# Patient Record
Sex: FEMALE | Race: WHITE | NOT HISPANIC OR LATINO | URBAN - METROPOLITAN AREA
[De-identification: names, ages, dates, MRNs, and addresses within clinical notes are randomized per-mention and may not be internally consistent; named-entity substitution may affect disease eponyms.]

---

## 2019-06-20 ENCOUNTER — OFFICE VISIT CONVERTED (OUTPATIENT)
Dept: FAMILY MEDICINE CLINIC | Facility: CLINIC | Age: 29
End: 2019-06-20
Attending: NURSE PRACTITIONER

## 2019-06-28 ENCOUNTER — HOSPITAL ENCOUNTER (OUTPATIENT)
Dept: OTHER | Facility: HOSPITAL | Age: 29
Discharge: HOME | End: 2019-06-28
Attending: NURSE PRACTITIONER

## 2020-03-09 ENCOUNTER — HOSPITAL ENCOUNTER (OUTPATIENT)
Dept: URGENT CARE | Facility: CLINIC | Age: 30
Discharge: HOME | End: 2020-03-09
Attending: EMERGENCY MEDICINE

## 2020-03-30 ENCOUNTER — HOSPITAL ENCOUNTER (OUTPATIENT)
Dept: OTHER | Facility: HOSPITAL | Age: 30
Discharge: HOME | End: 2020-03-30
Attending: OPHTHALMOLOGY

## 2020-03-30 LAB
APPEARANCE CSF: CLEAR
APTT BLD: 27.6 S (ref 22.2–34.2)
COLOR CSF: COLORLESS
GLUCOSE CSF-MCNC: 72 MG/DL (ref 40–70)
INR PPP: 0.95 (ref 2–3)
PLATELET # BLD AUTO: 279 10*3/UL (ref 130–400)
PMV BLD AUTO: 10.5 FL (ref 9.4–12.3)
PROT CSF-MCNC: 18 MG/DL (ref 15–45)
PROTHROMBIN TIME: 10.4 S (ref 9.4–12)
RBC # CSF MANUAL: 0 /UL
WBC # CSF MANUAL: 4 /UL (ref 0–5)
XANTHOCHROMIA FLD QL: NORMAL

## 2020-05-21 ENCOUNTER — OFFICE VISIT CONVERTED (OUTPATIENT)
Dept: NEUROLOGY | Facility: CLINIC | Age: 30
End: 2020-05-21
Attending: PSYCHIATRY & NEUROLOGY

## 2020-05-22 ENCOUNTER — CONVERSION ENCOUNTER (OUTPATIENT)
Dept: OTHER | Facility: HOSPITAL | Age: 30
End: 2020-05-22

## 2020-05-22 ENCOUNTER — OFFICE VISIT CONVERTED (OUTPATIENT)
Dept: NEUROLOGY | Facility: CLINIC | Age: 30
End: 2020-05-22
Attending: PSYCHIATRY & NEUROLOGY

## 2020-06-02 ENCOUNTER — OFFICE VISIT CONVERTED (OUTPATIENT)
Dept: FAMILY MEDICINE CLINIC | Facility: CLINIC | Age: 30
End: 2020-06-02
Attending: FAMILY MEDICINE

## 2020-07-14 ENCOUNTER — OFFICE VISIT CONVERTED (OUTPATIENT)
Dept: FAMILY MEDICINE CLINIC | Facility: CLINIC | Age: 30
End: 2020-07-14
Attending: FAMILY MEDICINE

## 2020-07-14 ENCOUNTER — HOSPITAL ENCOUNTER (OUTPATIENT)
Dept: FAMILY MEDICINE CLINIC | Facility: CLINIC | Age: 30
Discharge: HOME | End: 2020-07-14
Attending: FAMILY MEDICINE

## 2020-07-14 ENCOUNTER — CONVERSION ENCOUNTER (OUTPATIENT)
Dept: FAMILY MEDICINE CLINIC | Facility: CLINIC | Age: 30
End: 2020-07-14

## 2020-07-14 LAB
ALBUMIN SERPL-MCNC: 4.5 G/DL (ref 3.5–5)
ALP SERPL-CCNC: 93 U/L (ref 42–98)
ALT SERPL-CCNC: 16 U/L (ref 10–40)
AMYLASE SERPL-CCNC: 17 U/L (ref 30–110)
AST SERPL-CCNC: 13 U/L (ref 15–50)
BASOPHILS # BLD AUTO: 0.03 10*3/UL (ref 0–0.2)
BASOPHILS NFR BLD AUTO: 0.3 % (ref 0–3)
BILIRUB SERPL-MCNC: 0.17 MG/DL (ref 0.2–1.3)
CONV ABS IMM GRAN: 0.04 10*3/UL (ref 0–0.2)
CONV BILI, CONJUGATED: <0.2 MG/DL (ref 0–0.6)
CONV IMMATURE GRAN: 0.3 % (ref 0–1.8)
CONV TOTAL PROTEIN: 7.3 G/DL (ref 6.3–8.2)
CONV UNCONJUGATED BILIRUBIN: 0 MG/DL (ref 0–1.1)
DEPRECATED RDW RBC AUTO: 42.5 FL (ref 36.4–46.3)
EOSINOPHIL # BLD AUTO: 0.18 10*3/UL (ref 0–0.7)
EOSINOPHIL # BLD AUTO: 1.5 % (ref 0–7)
ERYTHROCYTE [DISTWIDTH] IN BLOOD BY AUTOMATED COUNT: 13 % (ref 11.7–14.4)
HCT VFR BLD AUTO: 42.5 % (ref 37–47)
HGB BLD-MCNC: 13.7 G/DL (ref 12–16)
LIPASE SERPL-CCNC: 20 U/L (ref 5–51)
LYMPHOCYTES # BLD AUTO: 2.46 10*3/UL (ref 1–5)
LYMPHOCYTES NFR BLD AUTO: 21 % (ref 20–45)
MCH RBC QN AUTO: 28.5 PG (ref 27–31)
MCHC RBC AUTO-ENTMCNC: 32.2 G/DL (ref 33–37)
MCV RBC AUTO: 88.4 FL (ref 81–99)
MONOCYTES # BLD AUTO: 0.74 10*3/UL (ref 0.2–1.2)
MONOCYTES NFR BLD AUTO: 6.3 % (ref 3–10)
NEUTROPHILS # BLD AUTO: 8.29 10*3/UL (ref 2–8)
NEUTROPHILS NFR BLD AUTO: 70.6 % (ref 30–85)
NRBC CBCN: 0 % (ref 0–0.7)
PLATELET # BLD AUTO: 299 10*3/UL (ref 130–400)
PMV BLD AUTO: 11.3 FL (ref 9.4–12.3)
RBC # BLD AUTO: 4.81 10*6/UL (ref 4.2–5.4)
WBC # BLD AUTO: 11.74 10*3/UL (ref 4.8–10.8)

## 2020-08-04 ENCOUNTER — OFFICE VISIT CONVERTED (OUTPATIENT)
Dept: GASTROENTEROLOGY | Facility: CLINIC | Age: 30
End: 2020-08-04
Attending: NURSE PRACTITIONER

## 2020-08-19 ENCOUNTER — HOSPITAL ENCOUNTER (OUTPATIENT)
Dept: PREADMISSION TESTING | Facility: HOSPITAL | Age: 30
Discharge: HOME | End: 2020-08-19
Attending: INTERNAL MEDICINE

## 2020-08-20 LAB — SARS-COV-2 RNA SPEC QL NAA+PROBE: NOT DETECTED

## 2020-08-24 ENCOUNTER — HOSPITAL ENCOUNTER (OUTPATIENT)
Dept: GASTROENTEROLOGY | Facility: HOSPITAL | Age: 30
Setting detail: HOSPITAL OUTPATIENT SURGERY
Discharge: HOME | End: 2020-08-24
Attending: INTERNAL MEDICINE

## 2020-08-24 LAB — HCG UR QL: NEGATIVE

## 2021-05-10 NOTE — H&P
History and Physical      Patient Name: Chen Calixto   Patient ID: [id number]   Sex: Female   YOB: 1990    Primary Care Provider: Marcus Zamora DO   Referring Provider: Marcus Zamora DO    Visit Date: 2020    Provider: Marcus Zamora DO   Location: [facility]   Location Address: [address] [place] [address]  [id number]   Location Phone: [phone]          Chief Complaint  · New Patient - RE Establish Care   · pt c/o anxiety, wanting to discuss medication   · pt c/o GERD      History Of Present Illness  Chen Calixto is a 30 year old /White female who presents to re-establish care She was recent Dx with Pseudotumor cerebri and papilledema. She tried Diamox, but was unable to tolerate it. She is now on Topamax. Since starting it she has had difficulty sleeping. Her h/a's are daily, but better.       Past Medical History  Disease Name Date Onset Notes   Allergic rhinitis, chronic --  --    Anxiety associated with depression 11/15/2016 --    Decreased libido 2017 --    Dermatofibroma 2016 --    Gallstones --  --    Gastroesophageal reflux 2017 --    Intracranial hypertension --  --    Migraine --  --    Nipple discharge in female 11/15/2016 --    Pap smear for cervical cancer screening  --    Papilledema --  --    Screening Mammogram  --    Tobacco abuse counseling 2016 --    Urticaria 2016 --          Past Surgical History  Procedure Name Date Notes    section  --    Cholecystectomy  --    Endoscopy --  --          Medication List  Name Date Started Instructions   amoxicillin 500 mg oral capsule  take 1 capsule (500 mg) by oral route 3 times per day   famotidine 20 mg oral tablet  take 1 tablet (20 mg) by oral route once daily at bedtime   sumatriptan succinate 50 mg oral tablet 2020 1 at onset of headache and repeat again in 2 hours as needed maximum 2/day or 8/month.   topiramate 25 mg  "oral tablet 05/22/2020 1 QD X 1 wk, 1 BID 2nd wk, 1 Q AM and 2 Q PM 3rd wk and 2 BID 4th wk and thereafter.         Allergy List  Allergen Name Date Reaction Notes   NO KNOWN DRUG ALLERGIES --  --  --          Family Medical History  Disease Name Relative/Age Notes   Renal Calculus Father/   Father   Family history of skin cancer Mother/   --    Family history of cancer Mother/  Sister/   --    Family history of heart disease Father/   --    Family history of diabetes mellitus Father/   --          Social History  Finding Status Start/Stop Quantity Notes   Alcohol Never 0/0 --  --    Caffeine Current some day 0/0 --  drinks occasionally; soft drinks; 1-2 times per day   Second hand smoke exposure Current some day 0/0 --  yes   Tobacco Former 14/29 2 ppd --          Review of Systems  · Constitutional  o Denies  o : fatigue  · Eyes  o Admits  o : double vision, blurred vision, changes in vision  · HENT  o Admits  o : headaches  · Neurologic  o Admits  o : loss of balance      Vitals  Date Time BP Position Site L\R Cuff Size HR RR TEMP (F) WT  HT  BMI kg/m2 BSA m2 O2 Sat HC       06/02/2020 02:16 /65 Sitting    71 - R  97.5 173lbs 2oz 5'  3\" 30.67 1.87 100 %          Physical Examination  · Constitutional  o Appearance  o : well-nourished, well developed, alert, in no acute distress, well-tended appearance  · Head and Face  o Head  o :   § Inspection  § : atraumatic, normocephalic  o Face  o :   § Inspection  § : no facial lesions  o HEENT  o : Unremarkable  · Eyes  o Conjunctivae  o : conjunctivae normal  o Sclerae  o : sclerae white  o Pupils and Irises  o : pupils equal and round, pupils reactive to light bilaterally  o Eyelids/Ocular Adnexae  o : eyelid appearance normal  · Ears, Nose, Mouth and Throat  o Ears  o :   § External Ears  § : appearance within normal limits, no lesions present  § Otoscopic Examination  § : tympanic membrane appearance within normal limits bilaterally without perforations, " mobility normal  o Nose  o :   § External Nose  § : appearance normal  o Oral Cavity  o :   § Oral Mucosa  § : oral mucosa normal  § Lips  § : lip appearance normal  § Teeth  § : normal dentition for age  § Gums  § : gums pink, non-swollen, no bleeding present  § Tongue  § : tongue appearance normal  § Palate  § : hard palate normal, soft palate appearance normal  o Throat  o :   § Oropharynx  § : no inflammation or lesions present, tonsils within normal limits  · Neck  o Inspection/Palpation  o : normal appearance, no masses or tenderness, trachea midline  o Thyroid  o : gland size normal, nontender, no nodules or masses present on palpation  · Respiratory  o Respiratory Effort  o : breathing unlabored  o Auscultation of Lungs  o : normal breath sounds  · Cardiovascular  o Heart  o :   § Auscultation of Heart  § : regular rate, normal rhythm, no murmurs present  o Peripheral Vascular System  o :   § Extremities  § : no edema  · Lymphatic  o Neck  o : no lymphadenopathy           Assessment  · Screening for depression     V79.0/Z13.89  · Anxiety associated with depression     300.4/F41.8  Previously she was on Effexor with good results. Christiano start there.   · Gastroesophageal reflux     530.81/K21.9  She has been using Pepcid with breakthrough symptoms. Will switch back to PPI.   · Migraine     346.10/G43.009  Improved, although she has been on the Topiramate X 2 weeks  · Papilledema     377.00/H47.10  Care per Anshul   · Intracranial hypertension     348.2/G93.2  she will continue the Topamax      Plan  · Orders  o ACO-18: Positive screen for clinical depression using a standardized tool and a follow-up plan documented () - V79.0/Z13.89 - 06/02/2020  o ACO-39: Current medications updated and reviewed () - - 06/02/2020  o ACO-14: Influenza immunization was not administered for reasons documented () - - 06/02/2020   pt refused  · Medications  o pantoprazole 40 mg oral tablet,delayed release  (/EC)   SIG: take 1 tablet (40 mg) by oral route once daily for 90 days   DISP: (90) tablets with 1 refills  Prescribed on 06/02/2020     o venlafaxine 75 mg oral capsule,extended release 24hr   SIG: take 1 capsule (75 mg) by oral route once daily for 90 days   DISP: (90) capsules with 0 refills  Prescribed on 06/02/2020     o famotidine 20 mg oral tablet   SIG: take 1 tablet (20 mg) by oral route once daily at bedtime   DISP: (0) tablet with 0 refills  Discontinued on 06/02/2020     · Instructions  o Depression Screen completed and scanned into the EMR under the designated folder within the patient's documents.  o Today's PHQ-9 result is 19  o Patient is taking medications as prescribed and doing well.   o Take all medications as prescribed/directed.  o Patient instructed/educated on their diet and exercise program.  o Patient was educated/instructed on their diagnosis, treatment and medications prior to discharge from the clinic today.  o Patient instructed to seek medical attention urgently for new or worsening symptoms.  o Call the office with any concerns or questions.  o Bring all medicines with their bottles to each office visit.  · Disposition  o Call or Return if symptoms worsen or persist.  o Return Visit Request in/on 4 weeks +/- 2 days ([id number]).            Electronically Signed by: Marcus Zamora DO -Author on June 2, 2020 03:02:04 PM

## 2021-05-10 NOTE — H&P
History and Physical      Patient Name: Chen Calixto   Patient ID: [id number]   Sex: Female   YOB: 1990    Primary Care Provider: Marcus Zamora DO   Referring Provider: Marcus Zamora DO    Visit Date: May 22, 2020    Provider: Harsh Fuentes MD   Location: [facility]   Location Address: [address] [place] [address]  [id number]   Location Phone: [phone]          Chief Complaint     New pt visit for IIH.       History Of Present Illness  Chen Calixto is a 30 year old /White female who presents today to [facility] today referred from Marcus Zamora DO.      30-year-old woman evaluated for pseudotumor cerebri and headaches.  She states that she started having headaches when she was 23 years old.  They are occurring every 2 months.  She states that since November of last year the headaches have been on a daily basis.  Headaches are usually frontal and goes to the back of her head in the morning and then in the evening and is usually right-sided but sometimes left-sided that it is severe.  The headaches are described as throbbing in the evenings associated light and noise sensitivity and nausea.  She states that in the morning the headaches are usually moderate in intensity and gets worse throughout the day.  She states that in November she started having this daily headaches and then in January she started having blurry vision.  The blurred vision comes and goes several times a day at least 30 times a day lasting for seconds at a time.  She states that she had a lumbar puncture in April and since that time the blurred vision went away.  She still has the black spots.  She states that in March she gets black spots on the right visual field that follows around.  There is a dot there that moves around.  She saw ophthalmologist Dr. Lisa and was given Diamox and then eventually topiramate with that it made her sick.  She was taking Diamox 250 mg  twice a day for couple weeks and then topiramate was added and she states that it made her headaches worse.  She states that the opening pressure was 26 on the lumbar puncture.    She has 2 children.  She states that she does not use birth control protection other than withdrawal.  She is not working at this time.  She has anxiety disorder and she gets dizzy several times a day and she gets short of breath during that time.  She gets dizzy and weak and then unsteady.  She has to sit down.  She states that she has racing thoughts.  She feels fatigued during the daytime.  She does not feel well rested when she wakes up in the morning.  She is sleepy throughout the daytime.       Past Medical History  Allergic rhinitis, chronic; Anxiety associated with depression; Decreased libido; Dermatofibroma; Gallstones; Gastroesophageal reflux; GERD without esophagitis; Nipple discharge; Nipple discharge in female; Tobacco abuse counseling; Urticaria         Past Surgical History  Cholecystectomy; Endoscopy         Medication List  amoxicillin oral; famotidine 40 mg oral tablet         Allergy List  NO KNOWN DRUG ALLERGIES         Family Medical History  Diabetes, unspecified type; Renal Calculus; Diabetes; Family history of skin cancer         Social History  Alcohol (Never); Caffeine (Current some day); Second hand smoke exposure (Current some day); Tobacco (Current every day)         Review of Systems  · Constitutional  o Admits  o : fatigue  o Denies  o : chills, excessive sweating, fever, sycope/passing out, weight gain, weight loss  · Eyes  o Admits  o : changes in vision, blurry vision, double vision  · HENT  o Admits  o : ringing in the ears  o Denies  o : loss of hearing, ear aches, sore throat, nasal congestion, sinus pain, nose bleeds, seasonal allergies  · Cardiovascular  o Denies  o : blood clots, swollen legs, anemia, easy burising or bleeding, transfusions  · Respiratory  o Admits  o : shortness of breath, dry  "cough  o Denies  o : productive cough, pneumonia, COPD  · Gastrointestinal  o Admits  o : reflux  o Denies  o : difficulty swallowing  · Genitourinary  o Denies  o : incontinence  · Neurologic  o Admits  o : headache, loss of balance, falls, difficulty with sleep, numbness/tingling/paresthesia , difficulty with coordination, weakness  o Denies  o : seizure, stroke, tremor, dizziness/vertigo, difficulty with dexterity  · Musculoskeletal  o Admits  o : neck stiffness/pain, muscle aches, weakness, spasms, low back pain  o Denies  o : swollen lymph nodes, joint pain, sciatica, pain radiating in arm, pain radiating in leg  · Endocrine  o Denies  o : diabetes, thyroid disorder  · Psychiatric  o Admits  o : anxiety, depression      Vitals  Date Time BP Position Site L\R Cuff Size HR RR TEMP (F) WT  HT  BMI kg/m2 BSA m2 O2 Sat HC       05/22/2020 01:48 PM        98         05/22/2020 02:18 /76 Sitting    85 - R   180lbs 0oz 5'  3\" 31.89 1.91           Physical Examination     She is alert, fluent, phasic, follows commands well.  Optic disks shows grade 1 papilledema, visual fields of full confrontation, EOMs full alterations gaze, facial strength is full, soft elevation Tylenol.  There is no weakness of the upper or lower extremities with muscle testing.  Reflex are normoactive and symmetrical.  Cerebellar testing is intact.  Station gait she is able to tiptoe, heel walk, philomena and tandem without difficulty.  Heart is regular in rhythm normal in rate.  Hyperventilation for 30 seconds reproduce the symptoms of dizziness, unsteadiness weakness.           Assessment  · Anxiety     300.00/F41.9  · Hyperventilation syndrome     306.1/F45.8  She has hyperventilation syndrome. Explained to her regarding controlling her breathing. She is to follow-up with her primary care physician to be treated for anxiety. She has been treated in the past with medications for anxiety however she is not taking anything at this " time.  · Medication overuse headache     339.3/G44.40  I told her to stop taking Tylenol and ibuprofen. She was taking it 3 times a week at least twice a day. I told her it can cause medication overuse headache. I will use sumatriptan for abortive treatment. She is to take 1 at onset of headache and repeated again in 2 hours as needed maximum 2/day or 8/month. She is not to take it more than 4 migraines a month. Explained to her the adverse effects of sumatriptan.  · Chronic migraine without aura     346.70/G43.709  She has chronic migraine without aura. I will start her on topiramate 25 mg initially and increase the dose by 25 mg weekly until she is taken up to 50 mg twice a day. Explained to her the adverse effects of the medication including paresthesias, mood swings, cognitive dysfunction, rarely kidney stones, metabolic acidosis and weight loss. I told her that it can cause birth defects and she is to use 2 different types of birth control methods to avoid pregnancy.    She is to call our office in the next 2 to 4 weeks to give me a progress report. If 50 mg twice a day off topiramate is not effective for I will increase her dose up to 100 mg twice a day.    60 minutes was spent for this high complexity visit more than half the time was spent face-to-face with the patient for examination, counseling, planning and recommendations.  · Sleep Apnea     780.57/G47.30  I would recommend for her primary care physician to set her up for sleep study at her [place] [facility] to see Dr. Mirta Porter.  · Pseudotumor cerebri     348.2/G93.2  I told her that I will use topiramate to see if this helps her symptoms. If topiramate is not helpful to her I will try her on a lower dose of Diamox after of maximize topiramate. I told her that I am not going to give her topiramate and Diamox at the same time    Problems Reconciled  Plan  · Medications  o sumatriptan succinate 50 mg oral tablet   SI at onset of headache and repeat  again in 2 hours as needed maximum 2/day or 8/month.   DISP: (9) tablets with 6 refills  Prescribed on 2020     o topiramate 25 mg oral tablet   SI QD X 1 wk, 1 BID 2nd wk, 1 Q AM and 2 Q PM 3rd wk and 2 BID 4th wk and thereafter.   DISP: (120) tablets with 6 refills  Prescribed on 2020     o Medications have been Reconciled  o Transition of Care or Provider Policy  · Instructions  o Encouraged to follow-up with Primary Care Provider for preventative care.            Electronically Signed by: Harsh Fuentes MD -Author on May 22, 2020 03:04:17 PM

## 2021-05-10 NOTE — H&P
"   History and Physical      Patient Name: Chen Calixto   Patient ID: [id number]   Sex: Female   YOB: 1990    Primary Care Provider: Marcus Zamora DO   Referring Provider: Marcus Zamora DO    Visit Date: August 4, 2020    Provider: CLARE Matthews   Location: -[facility]   Location Address: [address] [place] [address]  [id number]   Location Phone: [phone]          Chief Complaint  · \"Abdominal pain\"      History Of Present Illness  The patient is a 30 year old /White female, who presents on referral from Marcus Zamora DO, for a gastroenterology evaluation for abdominal pain.   The patient has described the pain as moderate to severe and pressure occurring constantly and. The pain is worsening. The location of the pain is epigastric and has been present for 3 weeks. The pain is improved with Carafate and is worse with liquids and spicy foods. The pain is reproducible with palpation over the area.   The patient admits to no other complaints and nausea.      The patient reports she has had reflux for a long time.  She reports she has had epigastric pain for a while, but is severely worsened within the past 3 weeks.  She reports the pain as a \"pressure, like a knot, pulling.  Like someone pushing me.\"  The pain is constant in nature.  She reports she has nausea with the pain.  She reports she had vomiting approximately 2 weeks ago, along with her .  She states that they were negative for COVID.  She states that Carafate has helped with the pain at first, but it wears off.  She states that liquids make the pain worse, along with spicy foods.  She reports she has had some dysphagia with food, but she felt that it was related to her heartburn.  She feels that her heartburn has also worsened in the past few weeks.  She states that with pantoprazole and famotidine have helped with the reflux.  She denies weight loss.  She had a cholecystectomy in 2014.    Labs " 2020: Hemoglobin 13.7, hematocrit 42.5, platelets 299, AST 13, ALT 16, total bili 0.17, alk phos 93, amylase 17, lipase 20    EGD by Dr. De La Garza on 2017 revealed normal duodenum erythema in the antrum, hiatal hernia and the GE junction, a mucosa suggestive of Pitt's.  Stomach antrum biopsy showed gastric mucosa with no significant histopathology.  GE junction biopsy showed squamocolumnar mucosa with reflux esophagitis with no intestinal metaplasia or dysplasia identified.       Past Medical History  Allergic rhinitis, chronic; Anxiety associated with depression; Decreased libido; Dermatofibroma; Gallstones; Gastroesophageal reflux; Intracranial hypertension; Migraine; Nipple discharge in female; Pap smear for cervical cancer screening; Papilledema; Screening Mammogram; Tobacco abuse counseling; Urticaria         Past Surgical History   section; Cholecystectomy; Endoscopy         Medication List  Carafate 1 gram oral tablet; famotidine 20 mg oral tablet; pantoprazole 40 mg oral tablet,delayed release (DR/EC); sumatriptan succinate 50 mg oral tablet; topiramate 25 mg oral tablet; venlafaxine 75 mg oral capsule,extended release 24hr         Allergy List  NO KNOWN DRUG ALLERGIES       Allergies Reconciled  Family Medical History  Renal Calculus; Family history of skin cancer; Family history of cancer; Family history of heart disease; Family history of diabetes mellitus         Social History  Alcohol (Never); Caffeine (Current some day); Second hand smoke exposure (Current some day); Tobacco (Former)         Review of Systems  · Constitutional  o Denies  o : chills, fever  · Eyes  o Denies  o : blurred vision, changes in vision  · Cardiovascular  o Denies  o : chest pain  · Respiratory  o Denies  o : shortness of breath  · Gastrointestinal  o Admits  o : nausea, vomiting  · Genitourinary  o Denies  o : dysuria, blood in urine  · Integument  o Denies  o : rash  · Neurologic  o Denies  o : tingling or  "numbness  · Musculoskeletal  o Denies  o : joint pain  · Endocrine  o Denies  o : weight gain, weight loss  · Psychiatric  o Denies  o : anxiety, depression      Vitals  Date Time BP Position Site L\R Cuff Size HR RR TEMP (F) WT  HT  BMI kg/m2 BSA m2 O2 Sat HC       08/04/2020 08:26 /75 Sitting    75 - R 16  165lbs 16oz 5'  3\" 29.41 1.83 100 %          Physical Examination  · Constitutional  o Appearance  o : Well-nourished, well developed, alert, in no acute distress, alert and oriented X 3.  · Eyes  o Vision  o :   § Visual Fields  § : move symmetrical in all directions  o Sclerae  o : sclerae anicteric  o Pupils and Irises  o : pupils equal and symetrical  · Neck  o Inspection/Palpation  o : Trachea is midline, no adenopathy  o Thyroid  o : Thyroid is not enlarged  · Respiratory  o Respiratory Effort  o : Breathing is unlabored.  o Inspection of Chest  o : normal appearance, no retractions  o Auscultation of Lungs  o : Chest is clear to auscultation bilaterally  · Cardiovascular  o Heart  o :   § Auscultation of Heart  § : no murmurs, rubs, or gallops, RRR  · Gastrointestinal  o Abdominal Examination  o : Abdomen is soft, epigastric region tender to palpation, with normal active bowel sounds, no appreciable hepatosplenomegaly.  · Genitourinary  o Digital Rectal Examination  o : Deferred  · Skin and Subcutaneous Tissue  o General Inspection  o : Skin is without focal lesions. Skin turgor is normal.  · Psychiatric  o Mood and Affect  o : Mood and affect are appropriate to circumstances.          Assessment  · Abdominal pain, epigastric     789.06/R10.13  · GERD     530.81  · Dysphagia     787.20/R13.10    Problems Reconciled  Plan  · Orders  o Consent for Esophagogastrodudodenoscopy (EGD) with dilatation -Possible risk/complications, benefits, and alternatives to surgical or invasive procedure have been explained to patient and/or legal guardian. -Patient has been evaluated and can tolerate anesthesia and/or " sedation. Risk, benefits, and alternatives to anesthesia and sedation have been explained to patient and/or legal guardian. ([id number]) - 789.06/R10.13, 530.81 - 08/04/2020  · Medications  o Medications have been Reconciled  o Transition of Care or Provider Policy  · Instructions  o 30-year-old female presenting today with worsening epigastric pain. Her PCP prescribed Carafate, which has helped with her pain, but she feels that the medication is not working as well as it was when she first started it. She is to continue to take Carafate, pantoprazole, and famotidine. We have discussed dosing and timing of medications. I have recommended undergoing an upper endoscopy to determine the etiology of her epigastric pain. I have instructed the patient that COVID testing would be required prior to procedure. Patient is agreeable to plan.            Electronically Signed by: CLARE Matthews -Author on August 4, 2020 08:52:33 AM  Electronically Co-signed by: Miguel Grant MD -Reviewer on August 5, 2020 11:53:32 AM

## 2021-05-10 NOTE — H&P
History and Physical      Patient Name: Chen Calixto   Patient ID: [id number]   Sex: Female   YOB: 1990    Primary Care Provider: Marcus Zamora DO   Referring Provider: Marcus Zamora DO    Visit Date: May 21, 2020    Provider: Harsh Fuentes MD   Location: [facility]   Location Address: [address] [place] [address]  [id number]   Location Phone: [phone]          Chief Complaint     F/u visit via zoom for [facility].       History Of Present Illness  Chen Calixto is a 30 year old /White female who presents today to [facility] today referred from Marcus Zamora DO.      Patient was not seen       Past Medical History  Allergic rhinitis, chronic; Anxiety associated with depression; Decreased libido; Dermatofibroma; Gallstones; Gastroesophageal Reflux; Intracranial hypertension; Migraine; Nipple discharge in female; Pap smear for cervical cancer screening; Papilledema; Screening Mammogram; Tobacco abuse counseling; Urticaria         Past Surgical History   section; Cholecystectomy; Endoscopy         Medication List  Carafate 1 gram oral tablet; famotidine 20 mg oral tablet; pantoprazole 40 mg oral tablet,delayed release (DR/EC); sumatriptan succinate 50 mg oral tablet; topiramate 25 mg oral tablet; venlafaxine 75 mg oral capsule,extended release 24hr         Allergy List  NO KNOWN DRUG ALLERGIES         Family Medical History  Renal Calculus; Family history of skin cancer; Family history of cancer; Family history of heart disease; Family history of diabetes mellitus         Social History  Alcohol (Never); Caffeine (Current some day); Second hand smoke exposure (Current some day); Tobacco (Former)         Review of Systems  · Constitutional  o Denies  o : chills, excessive sweating, fatigue, fever, sycope/passing out, weight gain, weight loss  · Eyes  o Denies  o : changes in vision, blurry vision, double vision  · HENT  o Denies  o : loss  of hearing, ringing in the ears, ear aches, sore throat, nasal congestion, sinus pain, nose bleeds, seasonal allergies  · Cardiovascular  o Denies  o : blood clots, swollen legs, anemia, easy burising or bleeding, transfusions  · Respiratory  o Denies  o : shortness of breath, dry cough, productive cough, pneumonia, COPD  · Gastrointestinal  o Denies  o : difficulty swallowing, reflux  · Genitourinary  o Denies  o : incontinence  · Neurologic  o Denies  o : headache, seizure, stroke, tremor, loss of balance, falls, dizziness/vertigo, difficulty with sleep, numbness/tingling/paresthesia , difficulty with coordination, difficulty with dexterity, weakness  · Musculoskeletal  o Denies  o : neck stiffness/pain, swollen lymph nodes, muscle aches, joint pain, weakness, spasms, sciatica, pain radiating in arm, pain radiating in leg, low back pain  · Endocrine  o Denies  o : diabetes, thyroid disorder  · Psychiatric  o Denies  o : anxiety, depression          Plan  · Instructions  o Encouraged to follow-up with Primary Care Provider for preventative care.            Electronically Signed by: Harsh Fuentes MD -Author on August 27, 2020 05:00:04 PM

## 2021-05-13 NOTE — PROGRESS NOTES
Progress Note      Patient Name: Chen Calixto   Patient ID: [id number]   Sex: Female   YOB: 1990    Primary Care Provider: Marcus Zamora DO   Referring Provider: Marcus Zamora DO    Visit Date: 2020    Provider: Marcus Zamora DO   Location: [facility]   Location Address: [address] [place] [address]  [id number]   Location Phone: [phone]          Chief Complaint  · 6 week follow up - Anxiety, Depression, GERD, Migrain, HTN  · pt c/o upper abdominal pain for 1 week      History Of Present Illness  Chen Calixto is a 30 year old /White female who presents for evaluation and treatment of: anxiety. She states that her anxiety is improved with the Effexor.      She states that in the past week she has been having upper abdominal pain. It is worse at the end and beginning of the day. It is better with eating. She was taking Tagament, but no longer working. No NSAIDs as of late. No ETOH.       Past Medical History  Disease Name Date Onset Notes   Allergic rhinitis, chronic --  --    Anxiety associated with depression 11/15/2016 --    Decreased libido 2017 --    Dermatofibroma 2016 --    Gallstones --  --    Gastroesophageal reflux 2017 --    Intracranial hypertension --  --    Migraine --  --    Nipple discharge in female 11/15/2016 --    Pap smear for cervical cancer screening  --    Papilledema --  --    Screening Mammogram  --    Tobacco abuse counseling 2016 --    Urticaria 2016 --          Past Surgical History  Procedure Name Date Notes    section  --    Cholecystectomy  --    Endoscopy --  --          Medication List  Name Date Started Instructions   famotidine 20 mg oral tablet  take 1 tablet (20 mg) by oral route once daily at bedtime   pantoprazole 40 mg oral tablet,delayed release (DR/EC) 2020 take 1 tablet (40 mg) by oral route once daily for 90 days   sumatriptan succinate 50  "mg oral tablet 05/22/2020 1 at onset of headache and repeat again in 2 hours as needed maximum 2/day or 8/month.   topiramate 25 mg oral tablet 05/22/2020 1 QD X 1 wk, 1 BID 2nd wk, 1 Q AM and 2 Q PM 3rd wk and 2 BID 4th wk and thereafter.   venlafaxine 75 mg oral capsule,extended release 24hr 06/02/2020 take 1 capsule (75 mg) by oral route once daily for 90 days         Allergy List  Allergen Name Date Reaction Notes   NO KNOWN DRUG ALLERGIES --  --  --          Family Medical History  Disease Name Relative/Age Notes   Renal Calculus Father/   Father   Family history of skin cancer Mother/   --    Family history of cancer Mother/  Sister/   --    Family history of heart disease Father/   --    Family history of diabetes mellitus Father/   --          Social History  Finding Status Start/Stop Quantity Notes   Alcohol Never 0/0 --  --    Caffeine Current some day 0/0 --  drinks occasionally; soft drinks; 1-2 times per day   Second hand smoke exposure Current some day 0/0 --  yes   Tobacco Former 14/29 2 ppd --          Review of Systems  · Constitutional  o Admits  o : fatigue  · HENT  o Admits  o : headaches  · Gastrointestinal  o Admits  o : nausea, abdominal pain  o Denies  o : vomiting, diarrhea, constipation, blood in stools, melena  · Psychiatric  o Denies  o : anxiety, depression      Vitals  Date Time BP Position Site L\R Cuff Size HR RR TEMP (F) WT  HT  BMI kg/m2 BSA m2 O2 Sat HC       05/22/2020 02:18 /76 Sitting    85 - R   180lbs 0oz 5'  3\" 31.89 1.91     06/02/2020 02:16 /65 Sitting    71 - R  97.5 173lbs 2oz 5'  3\" 30.67 1.87 100 %    07/14/2020 03:06 /73 Sitting    86 - R  97.7 171lbs 6oz 5'  3\" 30.36 1.86 100 %          Physical Examination  · Constitutional  o Appearance  o : well-nourished, well developed, no obvious deformities present  · Respiratory  o Respiratory Effort  o : breathing unlabored, no accessory muscle use  o Auscultation of Lungs  o : normal breath " sounds  · Cardiovascular  o Heart  o :   § Auscultation of Heart  § : regular rate, normal rhythm, no murmurs present  · Gastrointestinal  o Abdominal Examination  o : epigastric tenderness to palpation present, normal bowel sounds, tone normal without rigidity or guarding, no masses present  o Liver and spleen  o : no hepatomegaly present          Assessment  · Pain of upper abdomen     789.09/R10.10  will check labs and add Carafate  · Anxiety associated with depression     300.4/F41.8  improved      Plan  · Orders  o CBC with Auto Diff [facility] (10868) - 789.09/R10.10 - 07/14/2020  o ACO-39: Current medications updated and reviewed () - - 07/14/2020  o Liver Function Panel [facility] (37333) - 789.09/R10.10 - 07/14/2020  o Amylase (85705) - 789.09/R10.10 - 07/14/2020  o Lipase level (53346) - 789.09/R10.10 - 07/14/2020  · Medications  o Carafate 1 gram oral tablet   SIG: take 1 tablet (1 gram) by oral route 4 times per day on an empty stomach 1 hour before meals and at bedtime for 30 days   DISP: (120) tablets with 1 refills  Prescribed on 07/14/2020     o Medications have been Reconciled  o Transition of Care or Provider Policy  · Instructions  o Instructed to seek medical attention urgently for new or worsening symptoms.  o Patient is taking medications as prescribed and doing well.   o Take all medications as prescribed/directed.  o Patient instructed/educated on their diet and exercise program.  o Patient was educated/instructed on their diagnosis, treatment and medications prior to discharge from the clinic today.  o Patient instructed to seek medical attention urgently for new or worsening symptoms.  o Call the office with any concerns or questions.  o Bring all medicines with their bottles to each office visit.  · Disposition  o Call or Return if symptoms worsen or persist.  o Return Visit Request in/on 4 weeks +/- 2 days ([id number]).            Electronically Signed by: Marcus Zamora, DO -Author on July 14, 2020  03:41:30 PM

## 2021-05-15 VITALS
TEMPERATURE: 97.5 F | HEIGHT: 63 IN | BODY MASS INDEX: 30.68 KG/M2 | WEIGHT: 173.12 LBS | OXYGEN SATURATION: 100 % | DIASTOLIC BLOOD PRESSURE: 65 MMHG | SYSTOLIC BLOOD PRESSURE: 109 MMHG | HEART RATE: 71 BPM

## 2021-05-15 VITALS
OXYGEN SATURATION: 100 % | BODY MASS INDEX: 29.41 KG/M2 | HEART RATE: 75 BPM | WEIGHT: 166 LBS | HEIGHT: 63 IN | RESPIRATION RATE: 16 BRPM | SYSTOLIC BLOOD PRESSURE: 119 MMHG | DIASTOLIC BLOOD PRESSURE: 75 MMHG

## 2021-05-15 VITALS
TEMPERATURE: 97 F | OXYGEN SATURATION: 100 % | HEART RATE: 83 BPM | WEIGHT: 165 LBS | DIASTOLIC BLOOD PRESSURE: 73 MMHG | RESPIRATION RATE: 18 BRPM | HEIGHT: 63 IN | BODY MASS INDEX: 29.23 KG/M2 | SYSTOLIC BLOOD PRESSURE: 115 MMHG

## 2021-05-15 VITALS
BODY MASS INDEX: 31.89 KG/M2 | DIASTOLIC BLOOD PRESSURE: 76 MMHG | HEIGHT: 63 IN | SYSTOLIC BLOOD PRESSURE: 121 MMHG | HEART RATE: 85 BPM | WEIGHT: 180 LBS

## 2021-05-15 VITALS
OXYGEN SATURATION: 100 % | SYSTOLIC BLOOD PRESSURE: 107 MMHG | TEMPERATURE: 97.7 F | HEIGHT: 63 IN | WEIGHT: 171.37 LBS | DIASTOLIC BLOOD PRESSURE: 73 MMHG | BODY MASS INDEX: 30.36 KG/M2 | HEART RATE: 86 BPM

## 2021-05-15 VITALS — TEMPERATURE: 98 F
